# Patient Record
Sex: FEMALE
[De-identification: names, ages, dates, MRNs, and addresses within clinical notes are randomized per-mention and may not be internally consistent; named-entity substitution may affect disease eponyms.]

---

## 2024-10-02 ENCOUNTER — NON-APPOINTMENT (OUTPATIENT)
Age: 24
End: 2024-10-02

## 2024-10-03 ENCOUNTER — APPOINTMENT (OUTPATIENT)
Dept: OTOLARYNGOLOGY | Facility: CLINIC | Age: 24
End: 2024-10-03
Payer: COMMERCIAL

## 2024-10-03 VITALS
BODY MASS INDEX: 19.63 KG/M2 | HEART RATE: 68 BPM | SYSTOLIC BLOOD PRESSURE: 119 MMHG | DIASTOLIC BLOOD PRESSURE: 77 MMHG | HEIGHT: 64 IN | TEMPERATURE: 96.9 F | WEIGHT: 115 LBS

## 2024-10-03 DIAGNOSIS — H93.A1 PULSATILE TINNITUS, RIGHT EAR: ICD-10-CM

## 2024-10-03 DIAGNOSIS — Z78.9 OTHER SPECIFIED HEALTH STATUS: ICD-10-CM

## 2024-10-03 DIAGNOSIS — Z91.89 OTHER SPECIFIED PERSONAL RISK FACTORS, NOT ELSEWHERE CLASSIFIED: ICD-10-CM

## 2024-10-03 DIAGNOSIS — H61.23 IMPACTED CERUMEN, BILATERAL: ICD-10-CM

## 2024-10-03 PROBLEM — Z00.00 ENCOUNTER FOR PREVENTIVE HEALTH EXAMINATION: Status: ACTIVE | Noted: 2024-10-03

## 2024-10-03 PROCEDURE — 99204 OFFICE O/P NEW MOD 45 MIN: CPT

## 2024-10-03 PROCEDURE — G2211 COMPLEX E/M VISIT ADD ON: CPT | Mod: NC

## 2024-10-03 PROCEDURE — 92550 TYMPANOMETRY & REFLEX THRESH: CPT | Mod: 52

## 2024-10-03 PROCEDURE — 92557 COMPREHENSIVE HEARING TEST: CPT

## 2024-10-03 NOTE — DATA REVIEWED
[de-identified] : Audiogram personally reviewed and interpreted and my findings are as follows (10/3/24):  Right: SRT 10dB; %; Type A tymp; normal Left: SRT 15dB; %; Type A tymp; normal

## 2024-10-03 NOTE — PHYSICAL EXAM
[TextEntry] : General: AAO, no significant distress Psychiatric: affect pleasant and within normal limits Eyes: relatively symmetric, no obvious nystagmus Skin: no significant lesions on face Nose: no significant lesions; patent. Oral Cavity & Oropharynx: no significant deformity or lesions Neck/Lymphatics: no significant masses or abnormal cervical nodes palpated Respiratory: breathing comfortably; no significant distress Neurologic: cranial nerves II-XII grossly intact; EOMI Facial function: symmetric   Ear examination performed under binocular otologic microscope: Left Ear External: Pinna and periauricular area is normal. Canal: Ear canal skin is not inflamed or edematous. Left cerumen impaction cleaned under microscopy with instrumentation Tympanic Membrane: Intact and in good position.   Right Ear External: Pinna and periauricular area is normal. Canal: Ear canal skin is not inflamed or edematous. Right cerumen impaction cleaned under microscopy with instrumentation Tympanic Membrane: Intact and in good position.  Procedure: Left cerumen removal Pre-operative Diagnosis: Left cerumen impaction Post-operative Diagnosis: Left cerumen impaction Anesthesia: None Procedure Details: The patient was placed in the supine position. The left ear canal was determined to be impacted with cerumen. A curette and/or suction was used to remove the cerumen impaction under microscopy. Condition: Stable. Patient tolerated procedure well. Complications: None.   Procedure: Right cerumen removal Pre-operative Diagnosis: Right cerumen impaction Post-operative Diagnosis: Right cerumen impaction Anesthesia: None Procedure Details: The patient was placed in the supine position. The right ear canal was determined to be impacted with cerumen. A curette and/or suction was used to remove the cerumen impaction under microscopy. Condition: Stable. Patient tolerated procedure well. Complications: None.

## 2024-10-03 NOTE — HISTORY OF PRESENT ILLNESS
[de-identified] : 24F who presents with R pulsatile tinnitus that started after she got off a flight 3-4 weeks ago. The pulsatile tinnitus is daily and lasts about 1 hour. It sounds like her heartbeat. There are no factors that make the tinnitus better or worse or bring it on. No otalgia, otorrhea, vertigo, tinnitus, acute hearing changes. She has not noticed if it is suppressed if she turns her neck in a certain way.

## 2024-11-04 ENCOUNTER — APPOINTMENT (OUTPATIENT)
Dept: OTOLARYNGOLOGY | Facility: CLINIC | Age: 24
End: 2024-11-04